# Patient Record
Sex: FEMALE | Race: WHITE | NOT HISPANIC OR LATINO | Employment: FULL TIME | URBAN - METROPOLITAN AREA
[De-identification: names, ages, dates, MRNs, and addresses within clinical notes are randomized per-mention and may not be internally consistent; named-entity substitution may affect disease eponyms.]

---

## 2017-07-19 ENCOUNTER — OFFICE VISIT (OUTPATIENT)
Dept: OPTOMETRY | Facility: CLINIC | Age: 62
End: 2017-07-19
Payer: COMMERCIAL

## 2017-07-19 DIAGNOSIS — H10.13 ALLERGIC CONJUNCTIVITIS, BILATERAL: ICD-10-CM

## 2017-07-19 DIAGNOSIS — H25.13 NUCLEAR SCLEROSIS, BILATERAL: Primary | ICD-10-CM

## 2017-07-19 DIAGNOSIS — H52.13 HIGH MYOPIA, BILATERAL: ICD-10-CM

## 2017-07-19 PROCEDURE — 99999 PR PBB SHADOW E&M-EST. PATIENT-LVL I: CPT | Mod: PBBFAC,,, | Performed by: OPTOMETRIST

## 2017-07-19 PROCEDURE — 92015 DETERMINE REFRACTIVE STATE: CPT | Mod: S$GLB,,, | Performed by: OPTOMETRIST

## 2017-07-19 PROCEDURE — 92014 COMPRE OPH EXAM EST PT 1/>: CPT | Mod: S$GLB,,, | Performed by: OPTOMETRIST

## 2017-07-19 RX ORDER — AMLODIPINE AND BENAZEPRIL HYDROCHLORIDE 10; 20 MG/1; MG/1
1 CAPSULE ORAL DAILY
COMMUNITY
Start: 2017-06-20

## 2017-07-19 NOTE — PROGRESS NOTES
HPI     Patient's last dilated exam was: 6/10/2015  Pt states: eyes have been itchy and tearing, using Zaditor ou QHS apprx 3   days a week, not taking any systemic allergy medication regularly, only   when allergies are very bad. Mild blurred vision, near, sees better   without her glasses. Patient denies flashes, floaters, pain and double   vision.       Last edited by Krystina Richardson, PCT on 7/19/2017  9:35 AM.   (History)            Assessment /Plan     For exam results, see Encounter Report.    Nuclear sclerosis, bilateral    Allergic conjunctivitis, bilateral    High myopia, bilateral            1.  Early-monitor.  Eye health normal OU.  2.  Continue with Zaditor bid OU prn.  3.  Bifocal rx given          RTC 1 year for routine exam.

## 2018-08-15 ENCOUNTER — OFFICE VISIT (OUTPATIENT)
Dept: OPTOMETRY | Facility: CLINIC | Age: 63
End: 2018-08-15
Payer: COMMERCIAL

## 2018-08-15 DIAGNOSIS — H10.13 ALLERGIC CONJUNCTIVITIS, BILATERAL: ICD-10-CM

## 2018-08-15 DIAGNOSIS — H25.13 NUCLEAR SCLEROSIS, BILATERAL: Primary | ICD-10-CM

## 2018-08-15 DIAGNOSIS — H52.13 HIGH MYOPIA, BILATERAL: ICD-10-CM

## 2018-08-15 PROCEDURE — 92014 COMPRE OPH EXAM EST PT 1/>: CPT | Mod: S$GLB,,, | Performed by: OPTOMETRIST

## 2018-08-15 PROCEDURE — 99999 PR PBB SHADOW E&M-EST. PATIENT-LVL II: CPT | Mod: PBBFAC,,, | Performed by: OPTOMETRIST

## 2018-08-15 PROCEDURE — 92015 DETERMINE REFRACTIVE STATE: CPT | Mod: S$GLB,,, | Performed by: OPTOMETRIST

## 2018-08-15 NOTE — PROGRESS NOTES
"HPI     Annual Exam  Last Eye Exam 07/019/2017 with Dr. Peck  No real complaint, just watery itchy eyes  Using Systane, Zaditor on occasion- says "doesnt really work"  No flashes, floaters, eye pain.     Last edited by India Peck, OD on 8/15/2018  3:59 PM. (History)            Assessment /Plan     For exam results, see Encounter Report.    Nuclear sclerosis, bilateral    Allergic conjunctivitis, bilateral  -     olopatadine (PAZEO) 0.7 % Drop; Place 1 drop into both eyes once daily.  Dispense: 2.5 mL; Refill: 6    High myopia, bilateral            1.  Early-monitor.    2.  Pazeo QD OU as needed.  3.  Bifocal rx given.  Eye health normal OU                 "

## 2019-08-16 ENCOUNTER — TELEPHONE (OUTPATIENT)
Dept: OPTOMETRY | Facility: CLINIC | Age: 64
End: 2019-08-16

## 2019-08-16 NOTE — TELEPHONE ENCOUNTER
Alex [employer] ins benefits as of 19:       Member Name : GÓMEZ PACHECO  ID : [id number]  Group : [employer]   Patient Name : GÓMEZ PACHECO  Relationship : MEMBER   : 1955     Authorization Issued       Authorization Number: [id number]    Issue Date: 2019    Expiration Date: 2019    Services: Eye Examination, Contact Lens Evaluation/Fitting    Examination Copayment: $0.00

## 2019-08-21 ENCOUNTER — OFFICE VISIT (OUTPATIENT)
Dept: OPTOMETRY | Facility: CLINIC | Age: 64
End: 2019-08-21
Payer: COMMERCIAL

## 2019-08-21 DIAGNOSIS — H25.13 NUCLEAR SCLEROSIS, BILATERAL: ICD-10-CM

## 2019-08-21 DIAGNOSIS — Z01.00 ROUTINE EYE EXAM: Primary | ICD-10-CM

## 2019-08-21 DIAGNOSIS — H52.13 HIGH MYOPIA, BILATERAL: ICD-10-CM

## 2019-08-21 DIAGNOSIS — H10.13 ALLERGIC CONJUNCTIVITIS, BILATERAL: ICD-10-CM

## 2019-08-21 DIAGNOSIS — H43.812 POSTERIOR VITREOUS DETACHMENT, LEFT: ICD-10-CM

## 2019-08-21 PROCEDURE — 92014 COMPRE OPH EXAM EST PT 1/>: CPT | Mod: S$GLB,,, | Performed by: OPTOMETRIST

## 2019-08-21 PROCEDURE — 99999 PR PBB SHADOW E&M-EST. PATIENT-LVL II: ICD-10-PCS | Mod: PBBFAC,,, | Performed by: OPTOMETRIST

## 2019-08-21 PROCEDURE — 92015 PR REFRACTION: ICD-10-PCS | Mod: S$GLB,,, | Performed by: OPTOMETRIST

## 2019-08-21 PROCEDURE — 99999 PR PBB SHADOW E&M-EST. PATIENT-LVL II: CPT | Mod: PBBFAC,,, | Performed by: OPTOMETRIST

## 2019-08-21 PROCEDURE — 92015 DETERMINE REFRACTIVE STATE: CPT | Mod: S$GLB,,, | Performed by: OPTOMETRIST

## 2019-08-21 PROCEDURE — 92014 PR EYE EXAM, EST PATIENT,COMPREHESV: ICD-10-PCS | Mod: S$GLB,,, | Performed by: OPTOMETRIST

## 2019-08-21 RX ORDER — PAROXETINE HYDROCHLORIDE 40 MG/1
40 TABLET, FILM COATED ORAL EVERY MORNING
Refills: 1 | COMMUNITY
Start: 2019-06-30

## 2019-08-21 RX ORDER — ALPRAZOLAM 0.25 MG/1
TABLET ORAL
COMMUNITY
Start: 2019-08-20

## 2019-08-21 NOTE — PROGRESS NOTES
HPI     Last eye exam was 8/15/18 with     Pt states she has been having floaters for a couple months OU.  Pt states she is using Systane OU PRN. Pt states she was using another   drop other than systane but cannot do antihistamines.      Last edited by Alison Rodriguez on 8/21/2019  3:46 PM. (History)            Assessment /Plan     For exam results, see Encounter Report.    Routine eye exam    Nuclear sclerosis, bilateral    Posterior vitreous detachment, left    Allergic conjunctivitis, bilateral    High myopia, bilateral            1.  Early-monitor.  2.  Educated pt on PVD.   Retina flat and intact OU--no holes, tears, breaks, or RDs.  Monitor.  3.  Continue with artificial tears as needed.  4.  Bifocal rx given

## 2020-09-09 ENCOUNTER — OFFICE VISIT (OUTPATIENT)
Dept: OPTOMETRY | Facility: CLINIC | Age: 65
End: 2020-09-09
Payer: COMMERCIAL

## 2020-09-09 DIAGNOSIS — H25.13 NUCLEAR SCLEROSIS, BILATERAL: ICD-10-CM

## 2020-09-09 DIAGNOSIS — Z01.00 ROUTINE EYE EXAM: Primary | ICD-10-CM

## 2020-09-09 DIAGNOSIS — H43.812 POSTERIOR VITREOUS DETACHMENT, LEFT: ICD-10-CM

## 2020-09-09 DIAGNOSIS — H10.13 ALLERGIC CONJUNCTIVITIS, BILATERAL: ICD-10-CM

## 2020-09-09 DIAGNOSIS — H52.13 HIGH MYOPIA, BILATERAL: ICD-10-CM

## 2020-09-09 PROCEDURE — 92014 PR EYE EXAM, EST PATIENT,COMPREHESV: ICD-10-PCS | Mod: S$GLB,,, | Performed by: OPTOMETRIST

## 2020-09-09 PROCEDURE — 99999 PR PBB SHADOW E&M-EST. PATIENT-LVL II: CPT | Mod: PBBFAC,,, | Performed by: OPTOMETRIST

## 2020-09-09 PROCEDURE — 99999 PR PBB SHADOW E&M-EST. PATIENT-LVL II: ICD-10-PCS | Mod: PBBFAC,,, | Performed by: OPTOMETRIST

## 2020-09-09 PROCEDURE — 92014 COMPRE OPH EXAM EST PT 1/>: CPT | Mod: S$GLB,,, | Performed by: OPTOMETRIST

## 2020-09-09 NOTE — PROGRESS NOTES
HPI     Last eye exam was 8/21/19 with   Pt here for routine eye exam.    Pt states she is doing well, but still suffers with dry eyes.   Asked pt how her glasses were and pt states they are doing well, she does   not think she needs a new glasses rx.  Patient denies diplopia, headaches, flashes/floaters, and pain.  Pt would like to know if there is a cheaper alternative for Pazeo. She has   not used it in over a month.   Pt has been using Systane BID OU, and no eye sx.      Last edited by Alison Rodriguez on 9/9/2020  3:42 PM. (History)            Assessment /Plan     For exam results, see Encounter Report.    Routine eye exam    High myopia, bilateral    Nuclear sclerosis, bilateral    Posterior vitreous detachment, left    Allergic conjunctivitis, bilateral          1-2.  Continue w/ current rx  3.  Educated on cataracts and affects on vision.  Patient happy with vision.  Monitor.  4.  Longstanding-stable.  Retina flat and intact OU--no holes, tears, breaks, or RDs.    5.  Recommend Pataday OTC QD OU.

## 2021-12-29 ENCOUNTER — OFFICE VISIT (OUTPATIENT)
Dept: OPTOMETRY | Facility: CLINIC | Age: 66
End: 2021-12-29
Payer: COMMERCIAL

## 2021-12-29 DIAGNOSIS — H43.812 POSTERIOR VITREOUS DETACHMENT, LEFT: ICD-10-CM

## 2021-12-29 DIAGNOSIS — H10.13 ALLERGIC CONJUNCTIVITIS, BILATERAL: ICD-10-CM

## 2021-12-29 DIAGNOSIS — H25.13 NUCLEAR SCLEROSIS, BILATERAL: Primary | ICD-10-CM

## 2021-12-29 DIAGNOSIS — H52.13 HIGH MYOPIA, BILATERAL: ICD-10-CM

## 2021-12-29 PROCEDURE — 1159F MED LIST DOCD IN RCRD: CPT | Mod: CPTII,S$GLB,, | Performed by: OPTOMETRIST

## 2021-12-29 PROCEDURE — 99999 PR PBB SHADOW E&M-EST. PATIENT-LVL II: ICD-10-PCS | Mod: PBBFAC,,, | Performed by: OPTOMETRIST

## 2021-12-29 PROCEDURE — 92015 PR REFRACTION: ICD-10-PCS | Mod: S$GLB,,, | Performed by: OPTOMETRIST

## 2021-12-29 PROCEDURE — 92014 PR EYE EXAM, EST PATIENT,COMPREHESV: ICD-10-PCS | Mod: S$GLB,,, | Performed by: OPTOMETRIST

## 2021-12-29 PROCEDURE — 3288F PR FALLS RISK ASSESSMENT DOCUMENTED: ICD-10-PCS | Mod: CPTII,S$GLB,, | Performed by: OPTOMETRIST

## 2021-12-29 PROCEDURE — 4010F ACE/ARB THERAPY RXD/TAKEN: CPT | Mod: CPTII,S$GLB,, | Performed by: OPTOMETRIST

## 2021-12-29 PROCEDURE — 92014 COMPRE OPH EXAM EST PT 1/>: CPT | Mod: S$GLB,,, | Performed by: OPTOMETRIST

## 2021-12-29 PROCEDURE — 92015 DETERMINE REFRACTIVE STATE: CPT | Mod: S$GLB,,, | Performed by: OPTOMETRIST

## 2021-12-29 PROCEDURE — 99999 PR PBB SHADOW E&M-EST. PATIENT-LVL II: CPT | Mod: PBBFAC,,, | Performed by: OPTOMETRIST

## 2021-12-29 PROCEDURE — 4010F PR ACE/ARB THEARPY RXD/TAKEN: ICD-10-PCS | Mod: CPTII,S$GLB,, | Performed by: OPTOMETRIST

## 2021-12-29 PROCEDURE — 1101F PT FALLS ASSESS-DOCD LE1/YR: CPT | Mod: CPTII,S$GLB,, | Performed by: OPTOMETRIST

## 2021-12-29 PROCEDURE — 1101F PR PT FALLS ASSESS DOC 0-1 FALLS W/OUT INJ PAST YR: ICD-10-PCS | Mod: CPTII,S$GLB,, | Performed by: OPTOMETRIST

## 2021-12-29 PROCEDURE — 1159F PR MEDICATION LIST DOCUMENTED IN MEDICAL RECORD: ICD-10-PCS | Mod: CPTII,S$GLB,, | Performed by: OPTOMETRIST

## 2021-12-29 PROCEDURE — 3288F FALL RISK ASSESSMENT DOCD: CPT | Mod: CPTII,S$GLB,, | Performed by: OPTOMETRIST

## 2023-05-25 ENCOUNTER — OFFICE VISIT (OUTPATIENT)
Dept: OPTOMETRY | Facility: CLINIC | Age: 68
End: 2023-05-25
Payer: COMMERCIAL

## 2023-05-25 DIAGNOSIS — H43.392 FLOATERS, LEFT: ICD-10-CM

## 2023-05-25 DIAGNOSIS — H25.13 NUCLEAR SCLEROSIS, BILATERAL: ICD-10-CM

## 2023-05-25 DIAGNOSIS — H10.13 ALLERGIC CONJUNCTIVITIS, BILATERAL: ICD-10-CM

## 2023-05-25 DIAGNOSIS — H04.123 DRY EYE SYNDROME OF BOTH EYES: ICD-10-CM

## 2023-05-25 DIAGNOSIS — H52.13 HIGH MYOPIA, BILATERAL: Primary | ICD-10-CM

## 2023-05-25 PROCEDURE — 92014 PR EYE EXAM, EST PATIENT,COMPREHESV: ICD-10-PCS | Mod: S$GLB,,, | Performed by: OPTOMETRIST

## 2023-05-25 PROCEDURE — 4010F ACE/ARB THERAPY RXD/TAKEN: CPT | Mod: CPTII,S$GLB,, | Performed by: OPTOMETRIST

## 2023-05-25 PROCEDURE — 1101F PT FALLS ASSESS-DOCD LE1/YR: CPT | Mod: CPTII,S$GLB,, | Performed by: OPTOMETRIST

## 2023-05-25 PROCEDURE — 1159F MED LIST DOCD IN RCRD: CPT | Mod: CPTII,S$GLB,, | Performed by: OPTOMETRIST

## 2023-05-25 PROCEDURE — 1160F PR REVIEW ALL MEDS BY PRESCRIBER/CLIN PHARMACIST DOCUMENTED: ICD-10-PCS | Mod: CPTII,S$GLB,, | Performed by: OPTOMETRIST

## 2023-05-25 PROCEDURE — 1101F PR PT FALLS ASSESS DOC 0-1 FALLS W/OUT INJ PAST YR: ICD-10-PCS | Mod: CPTII,S$GLB,, | Performed by: OPTOMETRIST

## 2023-05-25 PROCEDURE — 4010F PR ACE/ARB THEARPY RXD/TAKEN: ICD-10-PCS | Mod: CPTII,S$GLB,, | Performed by: OPTOMETRIST

## 2023-05-25 PROCEDURE — 92014 COMPRE OPH EXAM EST PT 1/>: CPT | Mod: S$GLB,,, | Performed by: OPTOMETRIST

## 2023-05-25 PROCEDURE — 1160F RVW MEDS BY RX/DR IN RCRD: CPT | Mod: CPTII,S$GLB,, | Performed by: OPTOMETRIST

## 2023-05-25 PROCEDURE — 99999 PR PBB SHADOW E&M-EST. PATIENT-LVL III: ICD-10-PCS | Mod: PBBFAC,,, | Performed by: OPTOMETRIST

## 2023-05-25 PROCEDURE — 1159F PR MEDICATION LIST DOCUMENTED IN MEDICAL RECORD: ICD-10-PCS | Mod: CPTII,S$GLB,, | Performed by: OPTOMETRIST

## 2023-05-25 PROCEDURE — 1126F PR PAIN SEVERITY QUANTIFIED, NO PAIN PRESENT: ICD-10-PCS | Mod: CPTII,S$GLB,, | Performed by: OPTOMETRIST

## 2023-05-25 PROCEDURE — 3288F PR FALLS RISK ASSESSMENT DOCUMENTED: ICD-10-PCS | Mod: CPTII,S$GLB,, | Performed by: OPTOMETRIST

## 2023-05-25 PROCEDURE — 92015 DETERMINE REFRACTIVE STATE: CPT | Mod: S$GLB,,, | Performed by: OPTOMETRIST

## 2023-05-25 PROCEDURE — 92015 PR REFRACTION: ICD-10-PCS | Mod: S$GLB,,, | Performed by: OPTOMETRIST

## 2023-05-25 PROCEDURE — 3288F FALL RISK ASSESSMENT DOCD: CPT | Mod: CPTII,S$GLB,, | Performed by: OPTOMETRIST

## 2023-05-25 PROCEDURE — 99999 PR PBB SHADOW E&M-EST. PATIENT-LVL III: CPT | Mod: PBBFAC,,, | Performed by: OPTOMETRIST

## 2023-05-25 PROCEDURE — 1126F AMNT PAIN NOTED NONE PRSNT: CPT | Mod: CPTII,S$GLB,, | Performed by: OPTOMETRIST

## 2023-05-25 NOTE — PROGRESS NOTES
HPI    SUSANNA: 12/29/2021 Dr. Peck   Chief complaint (CC): routine eye exam pt say's she concern about why her   eye's are always feeling tired pt also states that always have sinus   issues   Glasses? +  Contacts? -  H/o eye surgery, injections or laser: -  H/o eye injury: -  Known eye conditions? -  Family h/o eye conditions? Mother Glaucoma   Eye gtts? Systane TID OU       (-) Flashes (+)  Floaters OS  (-) Mucous   (+)  Tearing (+) Itching (+) Burning   (-) Headaches (-) Eye Pain/discomfort (-) Irritation   (-)  Redness (-) Double vision (-) Blurry vision    Diabetic? -  A1c?       Last edited by Dick Riggins MA on 5/25/2023  9:08 AM.            Assessment /Plan     For exam results, see Encounter Report.  High myopia, bilateral  SRx released to patient. Patient educated on lens options. Normal ocular health. RTC 1 year for routine exam.     Dry eye syndrome of both eyes  Recommend Systane Ultra or Refresh Optive BID-TID OU to aid with symptoms of dry eyes.    Nuclear sclerosis, bilateral  Nuclear sclerotic cataract - not visually significant. Observe.    Floaters, left  No e/o h/b/t 360 degrees OU. Monitor for worsening of symptoms or S/Sx of RD.     Allergic conjunctivitis, bilateral  Pt states that antihistamines do not agree with her-- makes her nervous or jittery.

## 2023-10-17 ENCOUNTER — LAB VISIT (OUTPATIENT)
Dept: LAB | Facility: HOSPITAL | Age: 68
End: 2023-10-17
Attending: PSYCHIATRY & NEUROLOGY
Payer: COMMERCIAL

## 2023-10-17 ENCOUNTER — OFFICE VISIT (OUTPATIENT)
Dept: NEUROLOGY | Facility: CLINIC | Age: 68
End: 2023-10-17
Payer: COMMERCIAL

## 2023-10-17 VITALS
HEIGHT: 66 IN | BODY MASS INDEX: 31.9 KG/M2 | WEIGHT: 198.5 LBS | HEART RATE: 76 BPM | SYSTOLIC BLOOD PRESSURE: 114 MMHG | DIASTOLIC BLOOD PRESSURE: 74 MMHG

## 2023-10-17 DIAGNOSIS — R20.2 PARESTHESIA: ICD-10-CM

## 2023-10-17 DIAGNOSIS — H02.403 PTOSIS OF BOTH EYELIDS: ICD-10-CM

## 2023-10-17 DIAGNOSIS — H02.403 PTOSIS OF BOTH EYELIDS: Primary | ICD-10-CM

## 2023-10-17 DIAGNOSIS — G24.9 DYSTONIA: ICD-10-CM

## 2023-10-17 LAB
CERULOPLASMIN SERPL-MCNC: 31 MG/DL (ref 15–45)
CK SERPL-CCNC: 76 U/L (ref 20–180)
ESTIMATED AVG GLUCOSE: 103 MG/DL (ref 68–131)
FOLATE SERPL-MCNC: 16 NG/ML (ref 4–24)
HBA1C MFR BLD: 5.2 % (ref 4–5.6)
LACTATE SERPL-SCNC: 0.6 MMOL/L (ref 0.5–2.2)

## 2023-10-17 PROCEDURE — 3008F BODY MASS INDEX DOCD: CPT | Mod: CPTII,S$GLB,, | Performed by: PSYCHIATRY & NEUROLOGY

## 2023-10-17 PROCEDURE — 3078F PR MOST RECENT DIASTOLIC BLOOD PRESSURE < 80 MM HG: ICD-10-PCS | Mod: CPTII,S$GLB,, | Performed by: PSYCHIATRY & NEUROLOGY

## 2023-10-17 PROCEDURE — 1160F RVW MEDS BY RX/DR IN RCRD: CPT | Mod: CPTII,S$GLB,, | Performed by: PSYCHIATRY & NEUROLOGY

## 2023-10-17 PROCEDURE — 1159F MED LIST DOCD IN RCRD: CPT | Mod: CPTII,S$GLB,, | Performed by: PSYCHIATRY & NEUROLOGY

## 2023-10-17 PROCEDURE — 3288F PR FALLS RISK ASSESSMENT DOCUMENTED: ICD-10-PCS | Mod: CPTII,S$GLB,, | Performed by: PSYCHIATRY & NEUROLOGY

## 2023-10-17 PROCEDURE — 82550 ASSAY OF CK (CPK): CPT | Performed by: PSYCHIATRY & NEUROLOGY

## 2023-10-17 PROCEDURE — 4010F PR ACE/ARB THEARPY RXD/TAKEN: ICD-10-PCS | Mod: CPTII,S$GLB,, | Performed by: PSYCHIATRY & NEUROLOGY

## 2023-10-17 PROCEDURE — 82525 ASSAY OF COPPER: CPT | Performed by: PSYCHIATRY & NEUROLOGY

## 2023-10-17 PROCEDURE — 83519 RIA NONANTIBODY: CPT | Mod: 59 | Performed by: PSYCHIATRY & NEUROLOGY

## 2023-10-17 PROCEDURE — 4010F ACE/ARB THERAPY RXD/TAKEN: CPT | Mod: CPTII,S$GLB,, | Performed by: PSYCHIATRY & NEUROLOGY

## 2023-10-17 PROCEDURE — 1101F PT FALLS ASSESS-DOCD LE1/YR: CPT | Mod: CPTII,S$GLB,, | Performed by: PSYCHIATRY & NEUROLOGY

## 2023-10-17 PROCEDURE — 1126F PR PAIN SEVERITY QUANTIFIED, NO PAIN PRESENT: ICD-10-PCS | Mod: CPTII,S$GLB,, | Performed by: PSYCHIATRY & NEUROLOGY

## 2023-10-17 PROCEDURE — 82390 ASSAY OF CERULOPLASMIN: CPT | Performed by: PSYCHIATRY & NEUROLOGY

## 2023-10-17 PROCEDURE — 1126F AMNT PAIN NOTED NONE PRSNT: CPT | Mod: CPTII,S$GLB,, | Performed by: PSYCHIATRY & NEUROLOGY

## 2023-10-17 PROCEDURE — 99205 PR OFFICE/OUTPT VISIT, NEW, LEVL V, 60-74 MIN: ICD-10-PCS | Mod: S$GLB,,, | Performed by: PSYCHIATRY & NEUROLOGY

## 2023-10-17 PROCEDURE — 3074F PR MOST RECENT SYSTOLIC BLOOD PRESSURE < 130 MM HG: ICD-10-PCS | Mod: CPTII,S$GLB,, | Performed by: PSYCHIATRY & NEUROLOGY

## 2023-10-17 PROCEDURE — 82746 ASSAY OF FOLIC ACID SERUM: CPT | Performed by: PSYCHIATRY & NEUROLOGY

## 2023-10-17 PROCEDURE — 99205 OFFICE O/P NEW HI 60 MIN: CPT | Mod: S$GLB,,, | Performed by: PSYCHIATRY & NEUROLOGY

## 2023-10-17 PROCEDURE — 82607 VITAMIN B-12: CPT | Performed by: PSYCHIATRY & NEUROLOGY

## 2023-10-17 PROCEDURE — 99999 PR PBB SHADOW E&M-EST. PATIENT-LVL III: ICD-10-PCS | Mod: PBBFAC,,, | Performed by: PSYCHIATRY & NEUROLOGY

## 2023-10-17 PROCEDURE — 83519 RIA NONANTIBODY: CPT | Performed by: PSYCHIATRY & NEUROLOGY

## 2023-10-17 PROCEDURE — 1159F PR MEDICATION LIST DOCUMENTED IN MEDICAL RECORD: ICD-10-PCS | Mod: CPTII,S$GLB,, | Performed by: PSYCHIATRY & NEUROLOGY

## 2023-10-17 PROCEDURE — 36415 COLL VENOUS BLD VENIPUNCTURE: CPT | Performed by: PSYCHIATRY & NEUROLOGY

## 2023-10-17 PROCEDURE — 1160F PR REVIEW ALL MEDS BY PRESCRIBER/CLIN PHARMACIST DOCUMENTED: ICD-10-PCS | Mod: CPTII,S$GLB,, | Performed by: PSYCHIATRY & NEUROLOGY

## 2023-10-17 PROCEDURE — 3008F PR BODY MASS INDEX (BMI) DOCUMENTED: ICD-10-PCS | Mod: CPTII,S$GLB,, | Performed by: PSYCHIATRY & NEUROLOGY

## 2023-10-17 PROCEDURE — 3074F SYST BP LT 130 MM HG: CPT | Mod: CPTII,S$GLB,, | Performed by: PSYCHIATRY & NEUROLOGY

## 2023-10-17 PROCEDURE — 3078F DIAST BP <80 MM HG: CPT | Mod: CPTII,S$GLB,, | Performed by: PSYCHIATRY & NEUROLOGY

## 2023-10-17 PROCEDURE — 83605 ASSAY OF LACTIC ACID: CPT | Performed by: PSYCHIATRY & NEUROLOGY

## 2023-10-17 PROCEDURE — 99999 PR PBB SHADOW E&M-EST. PATIENT-LVL III: CPT | Mod: PBBFAC,,, | Performed by: PSYCHIATRY & NEUROLOGY

## 2023-10-17 PROCEDURE — 1101F PR PT FALLS ASSESS DOC 0-1 FALLS W/OUT INJ PAST YR: ICD-10-PCS | Mod: CPTII,S$GLB,, | Performed by: PSYCHIATRY & NEUROLOGY

## 2023-10-17 PROCEDURE — 83036 HEMOGLOBIN GLYCOSYLATED A1C: CPT | Performed by: PSYCHIATRY & NEUROLOGY

## 2023-10-17 PROCEDURE — 3288F FALL RISK ASSESSMENT DOCD: CPT | Mod: CPTII,S$GLB,, | Performed by: PSYCHIATRY & NEUROLOGY

## 2023-10-17 NOTE — PATIENT INSTRUCTIONS
Walk and exercise  Fall precautions  In case of any question, plz contact through [facility].

## 2023-10-17 NOTE — PROGRESS NOTES
[facility] - NEUROLOGY [address]TH [address] [facility], [place]    Date: 10/17/23  Patient Name: Zita Nunez   MRN: [id number]   Referring Provider: Self, Aaareferral    Thank you so much Self, Aaareferral for your patient referral to Neuromuscular team at [facility]. We take pride in our care coordination and look forward to your feedback and questions.    Assessment:   Zita Nunez is a 68 y.o. female is a very pleasant patient with new onset paresthesia without any objective abnormality on sensory examination but she has somewhat diminished deep tendon reflexes in lower extremities for evaluation.  She additionally has evidence of probable right cervical dystonia and bilateral ptosis with normal extraocular movements.  She may be having neurological disorder with multi level involvement including mitochondrial disorders, autoimmune disorder.  I will complete her evaluation including for myasthenia gravis.    I addressed her complaints. I provided information about fall precautions and healthy lifestyle. I would wish her very best for improvement/recovery in her condition.    Future direction based on feedback:    Plan:     Problem List Items Addressed This Visit          Neuro    Dystonia    Relevant Orders    CK    Lactic acid, plasma    CERULOPLASMIN    COPPER, SERUM    Paresthesia    Relevant Orders    HEMOGLOBIN A1C    Vitamin B12 Deficiency Panel    CERULOPLASMIN    COPPER, SERUM    FOLATE       Ophtho    Ptosis of both eyelids - Primary    Relevant Orders    CK    Lactic acid, plasma    Myasthenia Gravis Eval With Musk Reflex       Ella Brito MD    This evaluation was completed in >75  Minutes over 50% of the time spent on education & counseling. This includes face to face time and non-face to face time preparing to see the patient (eg, review of tests), obtaining and/or reviewing separately obtained history, documenting clinical information in the electronic or other  health record, independently interpreting results and communicating results to the patient/family/caregiver, or care coordinator.    Patient note was created using MModal Dictation.  Any errors in syntax or even information may not have been identified and edited on initial review prior to signing this note.    Details provided by:    Patient    Chief complaint today:  Paresthesia    History of Present Illness:      Ms. Zita Nunez is a 68 y.o. female presenting for evaluation of  paresthesia. She has PMH of right breast cancer in 2010, which was treated with surgery and 2 sessions of radiotherapy. She had no chemotherapy done for her cancer.     She presented with gradual tingling of her toes on the right a month ago. She had a fungal infection of her toes on both sides for a year. She was prescribed Alpha lipoic acid/multi-nutrient pill by her podiatrist on October 1st. The tingling of toes has gotten better with the medication. Walking has no effect on it, however, it is increased while moving her foot while at rest. She also noticed stiffness on the base of toes and soles on both sides and felt like walking on rocks. She had never felt dizzy and had no falls in the past. She does not have any difficulty going up or down the stairs.  There is no burning sensation in lower extremities.    She also has had ptosis bilaterally since 2020 which has gradually worsened. There is no double vision.    She has no h/o diabetes but her sister and mother were diabetic. She is  with no kids and takes medication for anxiety. She does not smoke and drinks alcohol occasionally. Her brother has h/o neuropathy but has no DM.    Chart Review:  Breast cancer (CMS/HCC) right lumpectomy and XRT 2009; mastectomy 2010    COVID-19 06/2022    Essential (primary) hypertension 8/5/2015    Generalized anxiety disorder 6/17/2013    Personal history of malignant neoplasm of breast 6/20/2013     Pertinent work up based on chart review  "for current condition:  Comprehensive metabolic panel normal with creatinine 1.0  CK 76      Review of Systems:  12 system review of systems is negative except for the symptoms mentioned in HPI.       PAST MEDICAL HISTORY:  Past Medical History:   Diagnosis Date    Hypertension        PAST SURGICAL HISTORY:  No past surgical history on file.    CURRENT MEDS:  I have reconciled the patient's home medications and discharge medications with the patient/family. I have updated all changes.  Refer to After-Visit Medication List.    Current Outpatient Medications   Medication Sig Dispense Refill    ALPRAZolam (XANAX) 0.25 MG tablet       amlodipine-benazepril 10-20mg (LOTREL) 10-20 mg per capsule Take 1 capsule by mouth once daily.      olopatadine (PAZEO) 0.7 % Drop Place 1 drop into both eyes once daily. 2.5 mL 6    paroxetine (PAXIL) 40 MG tablet Take 40 mg by mouth every morning.  1     No current facility-administered medications for this visit.       ALLERGIES:  Review of patient's allergies indicates:   Allergen Reactions    Lactase        FAMILY HISTORY:  Family History   Problem Relation Age of Onset    Glaucoma Mother     Glaucoma Maternal Uncle        SOCIAL HISTORY:  Social History     Tobacco Use    Smoking status: Never    Smokeless tobacco: Never         Objective:     Vitals:    10/17/23 1220   BP: 114/74   Pulse: 76   Weight: 90.1 kg (198 lb 8.4 oz)   Height: 5' 6" (1.676 m)     Wt Readings from Last 3 Encounters:   No data found for Wt     Body mass index is 32.04 kg/m².       Eyes: no tearing, discharge, no erythema   ENT: moist mucous membranes of the oral cavity, nares patent    Neck: Supple, full range of motion  Cardiovascular: Warm and well perfused, pulses equal and symmetrical  Lungs: Normal work of breathing, normal chest wall excursions  Skin: No rash, lesions, or breakdown on exposed skin  Psychiatry: Mood and affect are appropriate   Extremeties: No cyanosis, clubbing or " edema.    GENERAL/CONSTITUTIONAL:    -Well appearing; well nourished    HIGHER INTEGRATIVE FUNCTIONS:   -Attention & concentration: Normal   -Orientation: Oriented to person, place & time  -Memory: Normal  -Language: Normal   -Fund of Knowledge: Normal     CRANIAL NERVES:   -CN 2: Visual fields full  -CN 2,3: PERRL, bilateral ptosis.   -CN 3,4,6: EOMI  -CN 5: Facial sensation intact bilaterally  -CN 7: Facial strength/movement intact bilaterally  -CN 8: Hearing normal bilaterally  -CN 9,10: Palate elevates symmetrically  -CN 11: Normal shoulder shrug and head turn. she has tense muscles on the right side in neck with right-sided head tilt   -CN 12: Tongue protrudes midline     MOTOR:   -Tone: normal in upper and lower extremities  -UE/LE motor: 5/5 throughout, no pronator drift     SENSATION:   -Intact bilaterally to Vibration and pin prick    REFLEXES:   -2/4 upper and lower extremities bilaterally except Knee reflexes are 0/5 on the right and 1/5 on the left while ankle reflexes are normal bilaterally.   -Flexor plantar reflex bilaterally    COORDINATION:   -FNF normal bilaterally    GAIT:   -Normal casual gait. Able to stand on heels and toes without difficulty.      Scheduled Follow-up :  Future Appointments   Date Time Provider Department Center   10/17/2023 12:00 PM Ella Brito MD [facility]       After Visit Medication List :     Medication List            Accurate as of October 17, 2023 10:49 AM. If you have any questions, ask your nurse or doctor.                CONTINUE taking these medications      ALPRAZolam 0.25 MG tablet  Commonly known as: XANAX     amlodipine-benazepril 10-20mg 10-20 mg per capsule  Commonly known as: LOTREL     olopatadine 0.7 % Drop  Commonly known as: PAZEO  Place 1 drop into both eyes once daily.     paroxetine 40 MG tablet  Commonly known as: PAXIL              Signing Physician:          Ella Brito MD  , [facility] [employer] [facility]  [employer] [place]  Neurology Consultant. [facility].   [address],  [facility]. [address]th [place] [address].

## 2023-10-18 LAB — VIT B12 SERPL-MCNC: >1400 NG/L (ref 180–914)

## 2023-10-20 LAB — COPPER SERPL-MCNC: 1143 UG/L (ref 810–1990)

## 2023-10-30 LAB
ACHR BIND AB SER-SCNC: 0 NMOL/L
MG INTERPRETIVE COMMENTS: NORMAL

## 2023-11-06 LAB — MUSK ANTIBODY TEST: 0 NMOL/L (ref 0–0.02)

## 2024-08-03 ENCOUNTER — OFFICE VISIT (OUTPATIENT)
Dept: URGENT CARE | Facility: CLINIC | Age: 69
End: 2024-08-03
Payer: COMMERCIAL

## 2024-08-03 VITALS
OXYGEN SATURATION: 98 % | SYSTOLIC BLOOD PRESSURE: 140 MMHG | DIASTOLIC BLOOD PRESSURE: 84 MMHG | HEIGHT: 66 IN | WEIGHT: 192 LBS | BODY MASS INDEX: 30.86 KG/M2 | RESPIRATION RATE: 18 BRPM | TEMPERATURE: 98 F | HEART RATE: 74 BPM

## 2024-08-03 DIAGNOSIS — T14.90XA TRAUMA: ICD-10-CM

## 2024-08-03 DIAGNOSIS — S86.911A KNEE STRAIN, RIGHT, INITIAL ENCOUNTER: Primary | ICD-10-CM

## 2024-08-03 DIAGNOSIS — M25.561 ACUTE PAIN OF RIGHT KNEE: ICD-10-CM

## 2024-08-03 PROCEDURE — 99203 OFFICE O/P NEW LOW 30 MIN: CPT | Mod: 25,S$GLB,, | Performed by: NURSE PRACTITIONER

## 2024-08-03 PROCEDURE — 96372 THER/PROPH/DIAG INJ SC/IM: CPT | Mod: S$GLB,,, | Performed by: FAMILY MEDICINE

## 2024-08-03 PROCEDURE — 73590 X-RAY EXAM OF LOWER LEG: CPT | Mod: RT,S$GLB,, | Performed by: RADIOLOGY

## 2024-08-03 RX ORDER — TERBINAFINE HYDROCHLORIDE 250 MG/1
250 TABLET ORAL
COMMUNITY
Start: 2024-07-17

## 2024-08-03 RX ORDER — MECOBALAMIN 1000 MCG
TABLET,CHEWABLE ORAL DAILY
COMMUNITY

## 2024-08-03 RX ORDER — METHOCARBAMOL 500 MG/1
500 TABLET, FILM COATED ORAL 3 TIMES DAILY
Qty: 30 TABLET | Refills: 0 | Status: SHIPPED | OUTPATIENT
Start: 2024-08-03 | End: 2024-08-13

## 2024-08-03 RX ORDER — KETOROLAC TROMETHAMINE 30 MG/ML
30 INJECTION, SOLUTION INTRAMUSCULAR; INTRAVENOUS
Status: COMPLETED | OUTPATIENT
Start: 2024-08-03 | End: 2024-08-03

## 2024-08-03 RX ADMIN — KETOROLAC TROMETHAMINE 30 MG: 30 INJECTION, SOLUTION INTRAMUSCULAR; INTRAVENOUS at 01:08

## 2024-08-03 NOTE — PROGRESS NOTES
"Subjective:      Patient ID: Zita Nunez is a 69 y.o. female.    Vitals:  height is 5' 6" (1.676 m) and weight is 87.1 kg (192 lb). Her oral temperature is 98.1 °F (36.7 °C). Her blood pressure is 140/84 (abnormal) and her pulse is 74. Her respiration is 18 and oxygen saturation is 98%.     Chief Complaint: Knee Injury    Patient presents with c.o rt knee pain. Pain secondary to a fall that occurred yesterday. Patient notes increased joint swelling which has not resoled with otc aleve and icing. No abrasion. Patient requests an xray in clinic.         Knee Injury  This is a new problem. The current episode started yesterday. Episode frequency: once. Associated symptoms include joint swelling. Pertinent negatives include no chest pain, chills, fever, rash, vomiting or weakness. The symptoms are aggravated by walking.       Constitution: Negative for chills and fever.   Cardiovascular:  Negative for chest pain and sob on exertion.   Gastrointestinal:  Negative for vomiting.   Musculoskeletal:  Positive for joint swelling.   Skin:  Negative for rash.      Objective:     Physical Exam   Constitutional: She is oriented to person, place, and time. She appears well-developed. She is cooperative. awake  HENT:   Head: Normocephalic and atraumatic.   Ears:   Right Ear: Hearing and external ear normal.   Left Ear: Hearing and external ear normal.   Nose: Nose normal. No congestion.   Mouth/Throat: Uvula is midline, oropharynx is clear and moist and mucous membranes are normal. No posterior oropharyngeal erythema.   Eyes: Conjunctivae, EOM and lids are normal. Pupils are equal, round, and reactive to light. Extraocular movement intact   Neck: Trachea normal and phonation normal. Neck supple.   Cardiovascular: Normal rate, regular rhythm, S1 normal, S2 normal, normal heart sounds and normal pulses.   Pulmonary/Chest: Effort normal and breath sounds normal. She has no decreased breath sounds. She has no wheezes.   Abdominal: " Normal appearance and bowel sounds are normal. Soft.   Musculoskeletal:         General: Swelling and tenderness present.      Right knee: She exhibits decreased range of motion and swelling. Tenderness found.      Left knee: Normal.      Right lower leg: No edema.      Left lower leg: No edema.      Comments: Right knee pain with soft tissue swelling, decrease in range of motion. + popliteal pulse    Lymphadenopathy:     She has no cervical adenopathy.   Neurological: no focal deficit. She is alert and oriented to person, place, and time.   Skin: Skin is warm, dry and intact. Capillary refill takes less than 2 seconds.   Psychiatric: Her speech is normal and behavior is normal. Mood, judgment and thought content normal.   Nursing note and vitals reviewed.        X-Ray Tibia Fibula 2 View Right    Result Date: 8/3/2024  EXAMINATION: XR TIBIA FIBULA 2 VIEW RIGHT CLINICAL HISTORY: Injury, unspecified, initial encounter TECHNIQUE: AP and lateral views of the right tibia and fibula were performed. COMPARISON: None. FINDINGS: Four views right tibia fibula. There is questionable irregularity about the lateral tibial plateau, not confirmed on the orthogonal views.  Otherwise, the tibia and fibula appear intact.  The ankle is intact.  The ankle mortise is intact.  No significant edema.  The knee appears intact.     1. Questionable cortical irregularity versus positional change involving the lateral tibial plateau.  Dedicated knee radiography is recommended for further evaluation.  No findings to suggest distal tibial or fibular fracture. Electronically signed by: Siddhartha Haney MD Date:    08/03/2024 Time:    13:39     Assessment:     1. Knee strain, right, initial encounter    2. Trauma    3. Acute pain of right knee      Ace Wrap 4 inches to Right knee.  Discussed with pt findings of Xray, negative for distal and fibular fracture. Pt to follow up with Orthopedics  Plan:       Knee strain, right, initial encounter  -      methocarbamoL (ROBAXIN) 500 MG Tab; Take 1 tablet (500 mg total) by mouth 3 (three) times daily. for 10 days  Dispense: 30 tablet; Refill: 0  -     Ambulatory referral/consult to Orthopedics    Trauma  -     Cancel: XR KNEE 3 VIEW RIGHT; Future; Expected date: 08/03/2024  -     Ambulatory referral/consult to Orthopedics    Acute pain of right knee  -     ketorolac injection 30 mg  -     Ambulatory referral/consult to Orthopedics      Patient Instructions   Discharge instructions for Right knee pain/ Strain  Referral to Orthopedics submitted.    A sprain is an injury to the ligaments and capsule at a joint in the body. A strain is an injury to muscles or tendons. Immediate treatment of sprains or strains includes RICE - Rest, ice, compression and elevation to the affected joint or limb as needed.    Rest. Allow your injury to heal before you do slow movements.  Place an ice pack or a bag of frozen peas wrapped in a towel over the painful part. Never put ice right on the skin. Do not leave the ice on more than 10 to 15 minutes at a time. Ice after activity may help decrease pain and swelling. Never ice before stretching.  Prop your wrist/arm/knee/pain on pillows to help with swelling.  Use a splint/ brace if the doctor tells you to do this.  Please drink plenty of fluids.  Please get plenty of rest.      Pain Control  If not allergic, please take over the counter Tylenol (Acetaminophen)500mg (take2 by mouth) every 6 hours and/or Motrin (Ibuprofen) 800 mg every 46 hours as directed for control of pain and/or fever.    If you were prescribed a narcotic medication, do not drive or operate heavy equipment or machinery while taking these medications.    If you were not prescribed an anti-inflammatory medication, and if you do not have any history of stomach/intestinal ulcers, or kidney disease, or are not taking a blood thinner such as Coumadin, Plavix, Pradaxa, Eloquis, or Xaralta for example, it is OK to take over the  counter Ibuprofen or Advil or Motrin or Aleve as directed.  Do not take these medications on an empty stomach.    Please remember that you have received care at an urgent care today. Urgent cares are not emergency rooms and are not equipped to handle life threatening emergencies and cannot rule in or out certain medical conditions and you may be released before all of your medical problems are known or treated. Please arrange follow up with your primary care physician or speciality clinic   (orthopedics) within 2-5 days if your signs and symptoms have not resolved or worsen. Patient can call our Referral Hotline at [phone] to make an appointment.    Please return here or go to the Emergency Department for any concerns or worsening of condition.Patient was educated on signs/symptoms that would warrant emergent medical attention. Patient verbalized understanding.  More trouble getting up from a chair, going up and down stairs, or walking  Pain, swelling, warmth, numbness, tingling, or discoloration in the calf below the injured or sore knee  You are not feeling better in 2 or 3 days or you are feeling worse

## 2024-08-03 NOTE — PATIENT INSTRUCTIONS
Discharge instructions for Right knee pain/ Strain  Referral to Orthopedics submitted.    A sprain is an injury to the ligaments and capsule at a joint in the body. A strain is an injury to muscles or tendons. Immediate treatment of sprains or strains includes RICE - Rest, ice, compression and elevation to the affected joint or limb as needed.    Rest. Allow your injury to heal before you do slow movements.  Place an ice pack or a bag of frozen peas wrapped in a towel over the painful part. Never put ice right on the skin. Do not leave the ice on more than 10 to 15 minutes at a time. Ice after activity may help decrease pain and swelling. Never ice before stretching.  Prop your wrist/arm/knee/pain on pillows to help with swelling.  Use a splint/ brace if the doctor tells you to do this.  Please drink plenty of fluids.  Please get plenty of rest.      Pain Control  If not allergic, please take over the counter Tylenol (Acetaminophen)500mg (take2 by mouth) every 6 hours and/or Motrin (Ibuprofen) 800 mg every 46 hours as directed for control of pain and/or fever.    If you were prescribed a narcotic medication, do not drive or operate heavy equipment or machinery while taking these medications.    If you were not prescribed an anti-inflammatory medication, and if you do not have any history of stomach/intestinal ulcers, or kidney disease, or are not taking a blood thinner such as Coumadin, Plavix, Pradaxa, Eloquis, or Xaralta for example, it is OK to take over the counter Ibuprofen or Advil or Motrin or Aleve as directed.  Do not take these medications on an empty stomach.    Please remember that you have received care at an urgent care today. Urgent cares are not emergency rooms and are not equipped to handle life threatening emergencies and cannot rule in or out certain medical conditions and you may be released before all of your medical problems are known or treated. Please arrange follow up with your primary care  physician or speciality clinic   (orthopedics) within 2-5 days if your signs and symptoms have not resolved or worsen. Patient can call our Referral Hotline at [phone] to make an appointment.    Please return here or go to the Emergency Department for any concerns or worsening of condition.Patient was educated on signs/symptoms that would warrant emergent medical attention. Patient verbalized understanding.  More trouble getting up from a chair, going up and down stairs, or walking  Pain, swelling, warmth, numbness, tingling, or discoloration in the calf below the injured or sore knee  You are not feeling better in 2 or 3 days or you are feeling worse

## 2024-08-20 ENCOUNTER — OFFICE VISIT (OUTPATIENT)
Dept: ORTHOPEDICS | Facility: CLINIC | Age: 69
End: 2024-08-20
Payer: COMMERCIAL

## 2024-08-20 ENCOUNTER — HOSPITAL ENCOUNTER (OUTPATIENT)
Dept: RADIOLOGY | Facility: HOSPITAL | Age: 69
Discharge: HOME | End: 2024-08-20
Attending: PHYSICIAN ASSISTANT
Payer: COMMERCIAL

## 2024-08-20 DIAGNOSIS — M25.561 RIGHT KNEE PAIN, UNSPECIFIED CHRONICITY: ICD-10-CM

## 2024-08-20 DIAGNOSIS — S80.01XA CONTUSION OF RIGHT KNEE, INITIAL ENCOUNTER: Primary | ICD-10-CM

## 2024-08-20 PROCEDURE — 1160F RVW MEDS BY RX/DR IN RCRD: CPT | Mod: CPTII,S$GLB,, | Performed by: PHYSICIAN ASSISTANT

## 2024-08-20 PROCEDURE — 73562 X-RAY EXAM OF KNEE 3: CPT | Mod: TC,RT

## 2024-08-20 PROCEDURE — 4010F ACE/ARB THERAPY RXD/TAKEN: CPT | Mod: CPTII,S$GLB,, | Performed by: PHYSICIAN ASSISTANT

## 2024-08-20 PROCEDURE — 73560 X-RAY EXAM OF KNEE 1 OR 2: CPT | Mod: TC,LT

## 2024-08-20 PROCEDURE — 1159F MED LIST DOCD IN RCRD: CPT | Mod: CPTII,S$GLB,, | Performed by: PHYSICIAN ASSISTANT

## 2024-08-20 PROCEDURE — 99203 OFFICE O/P NEW LOW 30 MIN: CPT | Mod: S$GLB,,, | Performed by: PHYSICIAN ASSISTANT

## 2024-08-20 PROCEDURE — 99999 PR PBB SHADOW E&M-EST. PATIENT-LVL II: CPT | Mod: PBBFAC,,, | Performed by: PHYSICIAN ASSISTANT

## 2024-08-22 NOTE — PROGRESS NOTES
SUBJECTIVE:     History of Present Illness:  Zita Nunez is a 69 y.o. year old female here with complaints of right knee pain since a fall 2 weeks ago. The pain is located in the medial aspect of the knee.  The pain is described as achy.  She has been able to bear weight.  There is not catching or locking.  Aggravating factors include  stairs, walking .  Associated symptoms include bruising and swelling. .  Previous treatments include ice and rest..  There is not a history of previous injury or surgery to the knee.  The patient does not use an assistive device.    Review of patient's allergies indicates:   Allergen Reactions    Lactase Other (See Comments)     intolerant         Current Outpatient Medications   Medication Sig Dispense Refill    ALPRAZolam (XANAX) 0.25 MG tablet       amlodipine-benazepril 10-20mg (LOTREL) 10-20 mg per capsule Take 1 capsule by mouth once daily.      [id number] 5,000 mcg-1,360 mcg DFE-2.5 mg Chew Take by mouth once daily.      olopatadine (PAZEO) 0.7 % Drop Place 1 drop into both eyes once daily. 2.5 mL 6    paroxetine (PAXIL) 40 MG tablet Take 40 mg by mouth every morning.  1    terbinafine HCL (LAMISIL) 250 mg tablet Take 250 mg by mouth.       No current facility-administered medications for this visit.       Past Medical History:   Diagnosis Date    Anxiety     Fungal infection of toenail     Hypertension        No past surgical history on file.      Review of Systems:  ROS:  Constitutional: no fever or chills  Eyes: no visual changes  ENT: no nasal congestion or sore throat  Respiratory: no cough or shortness of breath  Musculoskeletal: no arthralgias or myalgias      OBJECTIVE:     PHYSICAL EXAM:        General: Well developed, well nourished, in no acute distress.  Neurological: Mood & affect are normal.  HEENT: NCAT, sclera nonicteric   Lungs: Respirations are equal and unlabored.   CV: 2+ bilateral upper and lower extremity pulses.   Skin: Intact  throughout with no rashes, erythema, or lesions  Extremities: No LE edema, no erythema or warmth of the skin in either lower extremity.    right Knee Exam:  Knee Range of Motion: 0-120   Effusion: mild  Condition of skin: intact  Location of tenderness: None   Strength: 5 of 5 quadriceps strength and 5 of 5 hamstring strength  Stability:  stable to testing  Varus /Valgus stress:  normal  Kemar:  negative      Right Hip Examination:  full painless range of motion, without tenderness    IMAGING:    X-rays of the right knee, personally reviewed by me, demonstrate no acute abnormality, mild DJD.  No fracture or dislocation.       ASSESSMENT     1. Contusion of right knee, initial encounter        PLAN:     We discussed with the patient at length all the different treatment options available for the knee including NSAIDS, acetaminophen, rest, ice, knee strengthening exercise, steroid injections for temporary relief, Viscosupplementation, and knee arthroplasty.     - Hinged knee brace  - Strict rest, she may continue WBAT  - ROM as tolerated  - Follow up 2 weeks with repeat x-rays right knee

## 2024-09-05 DIAGNOSIS — S80.01XA CONTUSION OF RIGHT KNEE, INITIAL ENCOUNTER: Primary | ICD-10-CM

## 2024-09-24 ENCOUNTER — HOSPITAL ENCOUNTER (OUTPATIENT)
Dept: RADIOLOGY | Facility: HOSPITAL | Age: 69
Discharge: HOME | End: 2024-09-24
Attending: PHYSICIAN ASSISTANT
Payer: COMMERCIAL

## 2024-09-24 ENCOUNTER — OFFICE VISIT (OUTPATIENT)
Dept: ORTHOPEDICS | Facility: CLINIC | Age: 69
End: 2024-09-24
Payer: COMMERCIAL

## 2024-09-24 DIAGNOSIS — M25.561 ACUTE PAIN OF RIGHT KNEE: Primary | ICD-10-CM

## 2024-09-24 DIAGNOSIS — M25.561 RIGHT KNEE PAIN, UNSPECIFIED CHRONICITY: ICD-10-CM

## 2024-09-24 PROCEDURE — 99999 PR PBB SHADOW E&M-EST. PATIENT-LVL I: CPT | Mod: PBBFAC,,, | Performed by: PHYSICIAN ASSISTANT

## 2024-09-24 PROCEDURE — 99499 UNLISTED E&M SERVICE: CPT | Mod: S$GLB,,, | Performed by: PHYSICIAN ASSISTANT

## 2024-09-24 NOTE — PROGRESS NOTES
Patient scheduled today for follow up of her knee injury    Her symptoms have completely resolved.  She no longer has any knee pain.    No LOS for today's visit

## 2024-11-19 ENCOUNTER — TELEPHONE (OUTPATIENT)
Dept: OPTOMETRY | Facility: CLINIC | Age: 69
End: 2024-11-19
Payer: COMMERCIAL

## 2025-01-03 ENCOUNTER — OFFICE VISIT (OUTPATIENT)
Dept: OPTOMETRY | Facility: CLINIC | Age: 70
End: 2025-01-03
Payer: COMMERCIAL

## 2025-01-03 DIAGNOSIS — H52.13 HIGH MYOPIA, BILATERAL: ICD-10-CM

## 2025-01-03 DIAGNOSIS — H25.13 NUCLEAR SCLEROSIS, BILATERAL: ICD-10-CM

## 2025-01-03 DIAGNOSIS — H43.393 VISUAL FLOATERS, BILATERAL: ICD-10-CM

## 2025-01-03 DIAGNOSIS — H10.13 ALLERGIC CONJUNCTIVITIS, BILATERAL: ICD-10-CM

## 2025-01-03 DIAGNOSIS — M43.6 HEAD TILT: ICD-10-CM

## 2025-01-03 DIAGNOSIS — H04.123 DRY EYE SYNDROME OF BOTH EYES: Primary | ICD-10-CM

## 2025-01-03 PROCEDURE — 99999 PR PBB SHADOW E&M-EST. PATIENT-LVL II: CPT | Mod: PBBFAC,,, | Performed by: OPTOMETRIST

## 2025-01-03 NOTE — PROGRESS NOTES
PAUL    SUSANNA: [birth year]  Chief complaint (CC): Patient is here for annual eye exam today.  Patient   hasn't noticed any vision changes since the last exam.  Patient has   watering and itching.  Patient uses AT's a couple of times a day.  Patient   had used allergy drops in the past but they make her anxious.  Glasses? +  Contacts? -  H/o eye surgery, injections or laser: -  H/o eye injury: -  Known eye conditions? See above  Family h/o eye conditions? Mother with glaucoma  Eye gtts? AT's Systane BID OU      (-) Flashes (+)  Floaters (-) Mucous   (+)  Tearing (+) Itching (-) Burning   (-) Headaches (-) Eye Pain/discomfort (-) Irritation   (-)  Redness (-) Double vision (-) Blurry vision    Diabetic? -  A1c? -      Last edited by Nikolas Patricia, OD on 1/3/2025  2:00 PM.            Assessment /Plan     For exam results, see Encounter Report.      Dry eye syndrome of both eyes  Recommend iVizia, Systane Ultra or Refresh Optive BID-TID OU to aid with symptoms of dry eyes.    Allergic conjunctivitis, bilateral  -     fluorometholone (EFLONE) 0.1 % DrpS; Place 1 drop into both eyes 2 (two) times daily.  Dispense: 5 mL; Refill: 3  Pt has response to anti-histamines that make her feel anxious. Rx FML BID OU prn.     Nuclear sclerosis, bilateral  Nuclear sclerotic cataract - not visually significant. Observe.    High myopia, bilateral  SRx released to patient. Patient educated on lens options. Normal ocular health. RTC 1 year for routine exam.     Visual floaters, bilateral  No e/o h/b/t 360 degrees OU. Monitor for worsening of symptoms or S/Sx of RD.     Head tilt  Pt has severe right sided head tilt that she relates to playing the piano and using electronic handheld devices. No reports of diplopia when straightened. Normal CT When straightened. Pt advised to consult PCP.

## 2025-01-16 ENCOUNTER — OFFICE VISIT (OUTPATIENT)
Dept: URGENT CARE | Facility: CLINIC | Age: 70
End: 2025-01-16
Payer: COMMERCIAL

## 2025-01-16 VITALS
OXYGEN SATURATION: 97 % | DIASTOLIC BLOOD PRESSURE: 67 MMHG | TEMPERATURE: 99 F | SYSTOLIC BLOOD PRESSURE: 115 MMHG | WEIGHT: 192 LBS | BODY MASS INDEX: 30.86 KG/M2 | RESPIRATION RATE: 18 BRPM | HEIGHT: 66 IN | HEART RATE: 82 BPM

## 2025-01-16 DIAGNOSIS — B96.89 ACUTE BACTERIAL SINUSITIS: Primary | ICD-10-CM

## 2025-01-16 DIAGNOSIS — J01.90 ACUTE BACTERIAL SINUSITIS: Primary | ICD-10-CM

## 2025-01-16 DIAGNOSIS — R05.9 COUGH, UNSPECIFIED TYPE: ICD-10-CM

## 2025-01-16 DIAGNOSIS — R09.81 NASAL CONGESTION: ICD-10-CM

## 2025-01-16 LAB
CTP QC/QA: YES
CTP QC/QA: YES
POC MOLECULAR INFLUENZA A AGN: NEGATIVE
POC MOLECULAR INFLUENZA B AGN: NEGATIVE
SARS-COV-2 AG RESP QL IA.RAPID: NEGATIVE

## 2025-01-16 PROCEDURE — 87502 INFLUENZA DNA AMP PROBE: CPT | Mod: QW,S$GLB,, | Performed by: NURSE PRACTITIONER

## 2025-01-16 PROCEDURE — 87811 SARS-COV-2 COVID19 W/OPTIC: CPT | Mod: QW,S$GLB,, | Performed by: NURSE PRACTITIONER

## 2025-01-16 PROCEDURE — 99213 OFFICE O/P EST LOW 20 MIN: CPT | Mod: S$GLB,,, | Performed by: NURSE PRACTITIONER

## 2025-01-16 RX ORDER — AMOXICILLIN AND CLAVULANATE POTASSIUM 875; 125 MG/1; MG/1
1 TABLET, FILM COATED ORAL 2 TIMES DAILY
Qty: 10 TABLET | Refills: 0 | Status: SHIPPED | OUTPATIENT
Start: 2025-01-16 | End: 2025-01-21

## 2025-01-16 NOTE — PROGRESS NOTES
"Subjective:      Patient ID: Zitagrace Nunez is a 69 y.o. female.    Vitals:  height is 5' 6" (1.676 m) and weight is 87.1 kg (192 lb). Her oral temperature is 98.6 °F (37 °C). Her blood pressure is 115/67 and her pulse is 82. Her respiration is 18 and oxygen saturation is 97%.     Chief Complaint: Nasal Congestion (Entered by patient)    Pt is a 69 year old female presenting with complaint of nasal congestion and productive cough. Congestion x 1.5 weeks. Pt reports she started coughing last night and describes the phlegm as greenish. Pt denies sore throat, abd issues, urinary issues, ear issues. Pt has been using dayquil, nyquil, and flonase daily.    Cough  Associated symptoms include nasal congestion. Pertinent negatives include no chest pain, chills, ear congestion, ear pain, fever, headaches, myalgias, sore throat or shortness of breath. There is no history of asthma, bronchitis or pneumonia.       Constitution: Negative for chills, fatigue and fever.   HENT:  Positive for congestion and sinus pressure. Negative for ear pain and sore throat.    Cardiovascular:  Negative for chest pain.   Respiratory:  Positive for cough and sputum production. Negative for shortness of breath.    Gastrointestinal:  Negative for nausea, vomiting and diarrhea.   Musculoskeletal:  Negative for muscle ache.   Neurological:  Negative for headaches.      Objective:     Physical Exam   Constitutional: She is oriented to person, place, and time.   HENT:   Head: Normocephalic.   Ears:   Right Ear: Hearing and external ear normal. No no drainage, swelling or tenderness. Tympanic membrane is not erythematous, not retracted and not bulging. A middle ear effusion is present.   Left Ear: Hearing and external ear normal. No no drainage, swelling or tenderness. Tympanic membrane is erythematous. Tympanic membrane is not retracted and not bulging. A middle ear effusion is present.   Nose: Nose normal. No mucosal edema, rhinorrhea or purulent " "discharge. Right sinus exhibits no maxillary sinus tenderness and no frontal sinus tenderness. Left sinus exhibits no maxillary sinus tenderness and no frontal sinus tenderness.   Mouth/Throat: Uvula is midline and mucous membranes are normal. No trismus in the jaw. No uvula swelling. Posterior oropharyngeal erythema present. No oropharyngeal exudate or posterior oropharyngeal edema.   Cardiovascular: Normal rate and regular rhythm.   Pulmonary/Chest: Effort normal and breath sounds normal.   Neurological: She is alert and oriented to person, place, and time.   Skin: Skin is warm and dry.   Nursing note and vitals reviewed.      Assessment:     1. Acute bacterial sinusitis    2. Cough, unspecified type    3. Nasal congestion        Plan:       Acute bacterial sinusitis  -     amoxicillin-clavulanate 875-125mg (AUGMENTIN) 875-125 mg per tablet; Take 1 tablet by mouth 2 (two) times daily. for 5 days  Dispense: 10 tablet; Refill: 0    Cough, unspecified type  -     SARS Coronavirus 2 Antigen, POCT Manual Read    Nasal congestion  -     POCT Influenza A/B MOLECULAR      Patient Instructions     Nasal congestion  -     POCT Influenza A/B MOLECULAR  Cough, unspecified type  -     SARS Coronavirus 2 Antigen, POCT Manual Read      Bacterial Sinusitis:    -     amoxicillin-clavulanate 875-125mg (AUGMENTIN) 875-125 mg per tablet; Take 1 tablet by mouth 2 (two) times daily. for 5 days  Dispense: 10 tablet; Refill: 0      - Rest at home.     - Drink plenty of fluids so you won't get dehydrated.    Avoid taking Decongestants such as pseudoephedrine (ex. Sudafed) or phenylephrine (ex. Mucinex FastMax, Dayquil, Nyquil, or any combo cold meds that say "cold," "sinus" or "-D").      - Cough recommendations:   Warm tea with honey can help with cough. Honey is a natural cough suppressant.  - Dextromethorphan (DM) is a cough suppressant over the counter (ie. mucinex DM OR delsym).        - Congestion recommendations:    - Mucinex " (guaifenesin) twice a day (or as directed) to help loosen mucous.       - Fever/Pain recommendations:  Alternate Tylenol or Ibuprofen as directed for fever/pain.   - Motrin/Ibuprofen every 6-8 hours for pain and inflammation. Do not take ibuprofen if you have a history of GI bleeding, kidney disease, or if you take blood thinners.    - Tylenol/acetaminophen every 6-8 hours for added pain relief.  Avoid tylenol if you have a history of liver disease.       -Sore throat recommendations: Warm fluids, warm salt water gargles, throat lozenges, tea, honey, soup, or drinking something cold or frozen.  Throat lozenges or sprays help reduce pain. Gargling with warm saltwater (1/4 teaspoon of salt in 1/2 cup of warm water) or an OTC anesthetic gargle may be useful for irritation.    Follow up with PCP if symptoms worsen or do not resolve fully.    When to seek medical advice  Call your healthcare provider right away if any of these occur:  Fever that is poorly controlled with OTC fever reducing medication  New or worsening ear pain, sinus pain, or headache  Stiff neck  You can't swallow liquids or you can't open your mouth wide because of throat pain  Signs of dehydration. These include very dark urine or no urine, sunken eyes, and dizziness.  Trouble breathing or noisy breathing  Muffled voice  Rash

## 2025-01-16 NOTE — PATIENT INSTRUCTIONS
"  Nasal congestion  -     POCT Influenza A/B MOLECULAR  Cough, unspecified type  -     SARS Coronavirus 2 Antigen, POCT Manual Read      Bacterial Sinusitis:    -     amoxicillin-clavulanate 875-125mg (AUGMENTIN) 875-125 mg per tablet; Take 1 tablet by mouth 2 (two) times daily. for 5 days  Dispense: 10 tablet; Refill: 0      - Rest at home.     - Drink plenty of fluids so you won't get dehydrated.    Avoid taking Decongestants such as pseudoephedrine (ex. Sudafed) or phenylephrine (ex. Mucinex FastMax, Dayquil, Nyquil, or any combo cold meds that say "cold," "sinus" or "-D").      - Cough recommendations:   Warm tea with honey can help with cough. Honey is a natural cough suppressant.  - Dextromethorphan (DM) is a cough suppressant over the counter (ie. mucinex DM OR delsym).        - Congestion recommendations:    - Mucinex (guaifenesin) twice a day (or as directed) to help loosen mucous.       - Fever/Pain recommendations:  Alternate Tylenol or Ibuprofen as directed for fever/pain.   - Motrin/Ibuprofen every 6-8 hours for pain and inflammation. Do not take ibuprofen if you have a history of GI bleeding, kidney disease, or if you take blood thinners.    - Tylenol/acetaminophen every 6-8 hours for added pain relief.  Avoid tylenol if you have a history of liver disease.       -Sore throat recommendations: Warm fluids, warm salt water gargles, throat lozenges, tea, honey, soup, or drinking something cold or frozen.  Throat lozenges or sprays help reduce pain. Gargling with warm saltwater (1/4 teaspoon of salt in 1/2 cup of warm water) or an OTC anesthetic gargle may be useful for irritation.    Follow up with PCP if symptoms worsen or do not resolve fully.    When to seek medical advice  Call your healthcare provider right away if any of these occur:  Fever that is poorly controlled with OTC fever reducing medication  New or worsening ear pain, sinus pain, or headache  Stiff neck  You can't swallow liquids or you " can't open your mouth wide because of throat pain  Signs of dehydration. These include very dark urine or no urine, sunken eyes, and dizziness.  Trouble breathing or noisy breathing  Muffled voice  Rash

## 2025-03-11 ENCOUNTER — TELEPHONE (OUTPATIENT)
Dept: OPTOMETRY | Facility: CLINIC | Age: 70
End: 2025-03-11
Payer: COMMERCIAL

## 2025-03-11 NOTE — TELEPHONE ENCOUNTER
----- Message from Fernando sent at 3/11/2025  2:47 PM CDT -----  Regarding: Consult/Advisory  Contact: [phone]  Consult/Advisory Name Of Caller: Zita  Contact Preference:  [phone] Nature of call: Pt is calling to get a copy of her eyeglass script emailed to her.

## 2025-06-06 DIAGNOSIS — H10.13 ALLERGIC CONJUNCTIVITIS, BILATERAL: ICD-10-CM

## 2025-06-06 RX ORDER — FLUOROMETHOLONE 1 MG/ML
1 SUSPENSION/ DROPS OPHTHALMIC 2 TIMES DAILY
Qty: 5 ML | Refills: 3 | Status: SHIPPED | OUTPATIENT
Start: 2025-06-06